# Patient Record
Sex: MALE | Race: WHITE | NOT HISPANIC OR LATINO | URBAN - METROPOLITAN AREA
[De-identification: names, ages, dates, MRNs, and addresses within clinical notes are randomized per-mention and may not be internally consistent; named-entity substitution may affect disease eponyms.]

---

## 2024-01-23 ENCOUNTER — OFFICE VISIT (OUTPATIENT)
Dept: URGENT CARE | Facility: CLINIC | Age: 54
End: 2024-01-23
Payer: COMMERCIAL

## 2024-01-23 VITALS
RESPIRATION RATE: 18 BRPM | HEART RATE: 98 BPM | SYSTOLIC BLOOD PRESSURE: 126 MMHG | WEIGHT: 214 LBS | OXYGEN SATURATION: 97 % | BODY MASS INDEX: 29.96 KG/M2 | DIASTOLIC BLOOD PRESSURE: 84 MMHG | HEIGHT: 71 IN | TEMPERATURE: 98.5 F

## 2024-01-23 DIAGNOSIS — B34.9 ACUTE VIRAL SYNDROME: Primary | ICD-10-CM

## 2024-01-23 PROCEDURE — 99213 OFFICE O/P EST LOW 20 MIN: CPT

## 2024-01-23 NOTE — LETTER
January 23, 2024     Patient: Herbert Xavier   YOB: 1970   Date of Visit: 1/23/2024       To Whom it May Concern:    Herbert Xavier was seen in my clinic on 1/23/2024. He may return to work on 1/25/2024 . Please excuse missed days.     If you have any questions or concerns, please don't hesitate to call.         Sincerely,          Edna Posadas PA-C        CC: No Recipients

## 2024-01-23 NOTE — PATIENT INSTRUCTIONS
Continue with supportive care at home.   Sudafed, ashley pot, ibuprofen over the counter.   No signs of bacterial infection today.   Follow up with PCP in 3-5 days if no improvement.   Proceed to ER if symptoms worsen.  Viral Syndrome   AMBULATORY CARE:   Viral syndrome  is a term used for symptoms of an infection caused by a virus. Viruses are spread easily from person to person on shared items.  Signs and symptoms  may start slowly or suddenly and last hours to days. They can be mild to severe and can change over days or hours. You may have any of the following:  Fever and chills    A runny or stuffy nose    Cough, sore throat, or hoarseness    Headache, or pain and pressure around your eyes    Muscle aches and joint pain    Shortness of breath or wheezing    Abdominal pain, cramps, and diarrhea    Nausea, vomiting, or loss of appetite    Call your local emergency number (911 in the [place]), or have someone call if:   You have a seizure.    You cannot be woken.    You have chest pain or trouble breathing.    Seek care immediately if:   You have a stiff neck, a bad headache, and sensitivity to light.    You feel weak, dizzy, or confused.    You stop urinating or urinate a lot less than usual.    You cough up blood or thick yellow or green mucus.    You have severe abdominal pain or your abdomen is larger than usual.    Call your doctor if:   Your symptoms do not get better with treatment or get worse after 3 days.    You have a rash or ear pain.    You have burning when you urinate.    You have questions or concerns about your condition or care.    Treatment for viral syndrome  may include medicines to manage your symptoms. Antibiotics are not given for a viral infection. You may  need any of the following:  Acetaminophen  decreases pain and fever. It is available without a doctor's order. Ask how much to take and how often to take it. Follow directions. Read the labels of all other medicines you are using to see if  they also contain acetaminophen, or ask your doctor or pharmacist. Acetaminophen can cause liver damage if not taken correctly.    NSAIDs , such as ibuprofen, help decrease swelling, pain, and fever. NSAIDs can cause stomach bleeding or kidney problems in certain people. If you take blood thinner medicine, always ask your healthcare provider if NSAIDs are safe for you. Always read the medicine label and follow directions.    Cold medicine  helps decrease swelling, control a cough, and relieve chest or nasal congestion.    Saline nasal spray  helps decrease nasal congestion.    Manage your symptoms:   Drink liquids as directed to prevent dehydration.  Ask how much liquid to drink each day and which liquids are best for you. Do not drink liquids with caffeine. Caffeine can make dehydration worse.     Get plenty of rest to help your body heal.  Take naps throughout the day. Ask your healthcare provider when you can return to work and your normal activities.    Use a cool mist humidifier  to increase air moisture in your home. This may make it easier for you to breathe and help decrease your cough.     Drink tea with honey or use cough drops for a sore throat.  Cough drops are available without a doctor's order. Follow directions for taking cough drops.    Do not smoke or be close to anyone who is smoking.  Nicotine and other chemicals in cigarettes and cigars can cause lung damage. Smoking can also delay healing. Ask your healthcare provider for information if you currently smoke and need help to quit. E-cigarettes or smokeless tobacco still contain nicotine. Talk to your healthcare provider before you use these products.    Prevent the spread of germs:   Wash your hands often throughout the day.  Use soap and water. Rub your soapy hands together, lacing your fingers, for at least 20 seconds. Rinse with warm, running water. Dry your hands with a clean towel or paper towel. Use hand  that contains alcohol if  soap and water are not available. Teach children how to wash their hands and use hand .         Cover sneezes and coughs.  Turn your face away and cover your mouth and nose with a tissue. Throw the tissue away. Use the bend of your arm if a tissue is not available. Then wash your hands well with soap and water or use hand . Teach children how to cover a cough or sneeze.    Stay home while you are sick.  Avoid crowds as much as possible.    Get the influenza (flu) vaccine as soon as recommended each year.  The flu vaccine is available starting in September or October. Ask your healthcare provider about the pneumonia vaccine. This vaccine is usually recommended every 5 years in older adults.       Follow up with your doctor as directed:  Write down your questions so you remember to ask them during your visits.  © Copyright [employer] 2023 Information is for End User's use only and may not be sold, redistributed or otherwise used for commercial purposes.  The above information is an  only. It is not intended as medical advice for individual conditions or treatments. Talk to your doctor, nurse or pharmacist before following any medical regimen to see if it is safe and effective for you.

## 2024-01-23 NOTE — PROGRESS NOTES
[facility]        NAME: Herbert Xavier is a 53 y.o. male  : 1970    MRN: [id number]  DATE: 2024  TIME: 12:50 PM    Assessment and Plan   Acute viral syndrome [B34.9]  1. Acute viral syndrome        Supportive care recommended.  Continue with Sudafed, ibuprofen, and Madeleine-pot as needed.  Patient Instructions   Continue with supportive care at home.   Sudafed, madeleine pot, ibuprofen over the counter.   No signs of bacterial infection today.   Follow up with PCP in 3-5 days if no improvement.   Proceed to ER if symptoms worsen.  Chief Complaint     Chief Complaint   Patient presents with    Cold Like Symptoms     Patient needs doctors note. He was having flu like sxs on Sat that have resolved except a cough. He was clammy but unsure of he had a fever. COVID was negative     History of Present Illness     Herbert Xavier is a 53 y.o. male presenting to the office today for upper respiratory complaints.   Symptoms have been present for 4 days, and include congestion, fatigue, diarrhea. Tested negative for COVID at home. Feels he is improving.    He has tried Sudafed, Hutson Cold and Flu, Madeleine-pot, ibuprofen for his symptoms, some relief.  Sick contacts include:   Review of Systems     Review of Systems   Constitutional:  Positive for appetite change and chills. Negative for fatigue and fever.   HENT:  Positive for congestion and sinus pressure. Negative for ear pain, sinus pain, sore throat and trouble swallowing.    Respiratory:  Positive for cough. Negative for shortness of breath, wheezing and stridor.    Cardiovascular:  Negative for chest pain and palpitations.   Gastrointestinal:  Positive for diarrhea. Negative for abdominal pain, nausea and vomiting.   Genitourinary: Negative.  Negative for difficulty urinating.   Musculoskeletal:  Negative for myalgias.   Skin:  Negative for color change and rash.   Neurological:  Negative for seizures and syncope.     Current  "Medications     No current outpatient medications on file.    Current Allergies     Allergies as of 01/23/2024    (No Known Allergies)            The following portions of the patient's history were reviewed and updated as appropriate: allergies, current medications, past family history, past medical history, past social history, past surgical history and problem list.     History reviewed. No pertinent past medical history.    History reviewed. No pertinent surgical history.    No family history on file.    Medications have been verified.    Objective     /84   Pulse 98   Temp 98.5 °F (36.9 °C) (Tympanic)   Resp 18   Ht 5' 11\" (1.803 m)   Wt 97.1 kg (214 lb)   SpO2 97%   BMI 29.85 kg/m²   No LMP for male patient.     Physical Exam     Physical Exam  Vitals and nursing note reviewed.   Constitutional:       General: He is not in acute distress.     Appearance: Normal appearance. He is well-developed and normal weight. He is not ill-appearing, toxic-appearing or diaphoretic.   HENT:      Head: Normocephalic and atraumatic.      Right Ear: Tympanic membrane, ear canal and external ear normal. No drainage, swelling or tenderness. No middle ear effusion. There is no impacted cerumen. Tympanic membrane is not erythematous.      Left Ear: Tympanic membrane, ear canal and external ear normal. No drainage, swelling or tenderness.  No middle ear effusion. There is no impacted cerumen. Tympanic membrane is not erythematous.      Nose: Congestion and rhinorrhea present.      Mouth/Throat:      Mouth: Mucous membranes are moist. No oral lesions.      Pharynx: Uvula midline. Posterior oropharyngeal erythema present. No pharyngeal swelling, oropharyngeal exudate or uvula swelling.      Tonsils: No tonsillar exudate or tonsillar abscesses.   Eyes:      General: No scleral icterus.        Right eye: No discharge.         Left eye: No discharge.      Conjunctiva/sclera: Conjunctivae normal.   Neck:      Thyroid: No " thyromegaly.   Cardiovascular:      Rate and Rhythm: Normal rate and regular rhythm.      Pulses: Normal pulses.      Heart sounds: Normal heart sounds. No murmur heard.     No friction rub. No gallop.   Pulmonary:      Effort: Pulmonary effort is normal. No respiratory distress.      Breath sounds: Normal breath sounds. No stridor. No wheezing, rhonchi or rales.   Chest:      Chest wall: No tenderness.   Musculoskeletal:         General: Normal range of motion.      Cervical back: Normal range of motion and neck supple.   Lymphadenopathy:      Cervical: No cervical adenopathy.   Skin:     General: Skin is warm and dry.      Capillary Refill: Capillary refill takes less than 2 seconds.   Neurological:      General: No focal deficit present.      Mental Status: He is alert and oriented to person, place, and time.   Psychiatric:         Mood and Affect: Mood normal.         Behavior: Behavior normal.

## 2024-07-01 ENCOUNTER — OFFICE VISIT (OUTPATIENT)
Dept: FAMILY MEDICINE CLINIC | Facility: CLINIC | Age: 54
End: 2024-07-01
Payer: COMMERCIAL

## 2024-07-01 VITALS
DIASTOLIC BLOOD PRESSURE: 74 MMHG | WEIGHT: 220 LBS | SYSTOLIC BLOOD PRESSURE: 130 MMHG | BODY MASS INDEX: 29.8 KG/M2 | RESPIRATION RATE: 20 BRPM | HEIGHT: 72 IN | HEART RATE: 69 BPM | OXYGEN SATURATION: 96 %

## 2024-07-01 DIAGNOSIS — Z11.4 SCREENING FOR HIV (HUMAN IMMUNODEFICIENCY VIRUS): ICD-10-CM

## 2024-07-01 DIAGNOSIS — Z80.6 FAMILY HISTORY OF LEUKEMIA: ICD-10-CM

## 2024-07-01 DIAGNOSIS — Z13.220 SCREENING FOR LIPID DISORDERS: ICD-10-CM

## 2024-07-01 DIAGNOSIS — R53.83 OTHER FATIGUE: ICD-10-CM

## 2024-07-01 DIAGNOSIS — E66.3 OVERWEIGHT (BMI 25.0-29.9): ICD-10-CM

## 2024-07-01 DIAGNOSIS — Z12.11 SCREENING FOR COLON CANCER: ICD-10-CM

## 2024-07-01 DIAGNOSIS — K42.9 UMBILICAL HERNIA WITHOUT OBSTRUCTION AND WITHOUT GANGRENE: ICD-10-CM

## 2024-07-01 DIAGNOSIS — Z12.5 SCREENING FOR PROSTATE CANCER: ICD-10-CM

## 2024-07-01 DIAGNOSIS — Z11.59 NEED FOR HEPATITIS C SCREENING TEST: ICD-10-CM

## 2024-07-01 DIAGNOSIS — Z00.00 HEALTHCARE MAINTENANCE: ICD-10-CM

## 2024-07-01 DIAGNOSIS — R03.0 WHITE COAT SYNDROME WITHOUT DIAGNOSIS OF HYPERTENSION: Primary | ICD-10-CM

## 2024-07-01 PROCEDURE — 99204 OFFICE O/P NEW MOD 45 MIN: CPT | Performed by: FAMILY MEDICINE

## 2024-07-01 NOTE — PROGRESS NOTES
Ambulatory Visit  Name: Herbert Xavier      : 1970      MRN: [id number]  Encounter Provider: Nitesh Holden DO  Encounter Date: 2024   Encounter department: [facility]    1.  Whitecoat syndrome without hypertension.  Patient states he is always had this.  Blood pressure normalizes by end of office visit we will continue to monitor off medication  2.  BMI 29.84 patient with mild muscular body habitus unsure if this is accurate  3.  Healthcare maintenance discussed screening for hepatitis C, HIV and lipid, orders placed  4.  Screening colon cancer Cologuard ordered  5.  Screening prostate cancer PSA ordered  6.  Pain umbilical hernia, present times years symptom-free patient declines surgical intervention at this time if becomes symptomatic he will make office visit  7.  Mild fatigue will check TSH and testosterone  8.  Family history of leukemia, CBC with differential ordered  9.  Return in 1 year sooner if needed      Assessment & Plan   1. White coat syndrome without diagnosis of hypertension  Assessment & Plan:  Blood pressure normalizes by end of office visit will monitor without medication at present  Orders:  -     CBC and differential; Future; Expected date: 2024  -     Comprehensive metabolic panel; Future; Expected date: 2024  -     Lipid Panel with Direct LDL reflex; Future; Expected date: 2024  -     TSH, 3rd generation with Free T4 reflex; Future; Expected date: 2024  -     UA (URINE) with reflex to Scope; Future; Expected date: 2024  -     PSA, Total Screen; Future; Expected date: 2024  -     Testosterone; Future; Expected date: 2024  2. Overweight (BMI 25.0-29.9)  Assessment & Plan:  BMI 29.84 patient does have muscular body habitus unsure if this is accurate  Orders:  -     CBC and differential; Future; Expected date: 2024  -     Comprehensive metabolic panel; Future; Expected date: 2024  -     Lipid Panel  with Direct LDL reflex; Future; Expected date: 07/02/2024  -     TSH, 3rd generation with Free T4 reflex; Future; Expected date: 07/02/2024  -     UA (URINE) with reflex to Scope; Future; Expected date: 07/02/2024  -     PSA, Total Screen; Future; Expected date: 07/02/2024  -     Testosterone; Future; Expected date: 07/02/2024  3. Healthcare maintenance  Assessment & Plan:  Discussed screening for hepatitis C, HIV, and lipids, orders placed  Orders:  -     CBC and differential; Future; Expected date: 07/02/2024  -     Comprehensive metabolic panel; Future; Expected date: 07/02/2024  -     Lipid Panel with Direct LDL reflex; Future; Expected date: 07/02/2024  -     TSH, 3rd generation with Free T4 reflex; Future; Expected date: 07/02/2024  -     UA (URINE) with reflex to Scope; Future; Expected date: 07/02/2024  -     PSA, Total Screen; Future; Expected date: 07/02/2024  -     Testosterone; Future; Expected date: 07/02/2024  4. Screening for colon cancer  Assessment & Plan:  Discussed colonoscopy versus Cologuard.  Average risk.  Patient chose Cologuard, ordered  Orders:  -     Cologuard  -     CBC and differential; Future; Expected date: 07/02/2024  -     Comprehensive metabolic panel; Future; Expected date: 07/02/2024  -     Lipid Panel with Direct LDL reflex; Future; Expected date: 07/02/2024  -     TSH, 3rd generation with Free T4 reflex; Future; Expected date: 07/02/2024  -     UA (URINE) with reflex to Scope; Future; Expected date: 07/02/2024  -     PSA, Total Screen; Future; Expected date: 07/02/2024  -     Testosterone; Future; Expected date: 07/02/2024  5. Screening for prostate cancer  Assessment & Plan:  PSA ordered  Orders:  -     CBC and differential; Future; Expected date: 07/02/2024  -     Comprehensive metabolic panel; Future; Expected date: 07/02/2024  -     Lipid Panel with Direct LDL reflex; Future; Expected date: 07/02/2024  -     TSH, 3rd generation with Free T4 reflex; Future; Expected date:  07/02/2024  -     UA (URINE) with reflex to Scope; Future; Expected date: 07/02/2024  -     PSA, Total Screen; Future; Expected date: 07/02/2024  -     Testosterone; Future; Expected date: 07/02/2024  6. Umbilical hernia without obstruction and without gangrene  Assessment & Plan:  This has been present for years and is symptom-free patient declined surgical intervention at this time and chooses to observe  Orders:  -     CBC and differential; Future; Expected date: 07/02/2024  -     Comprehensive metabolic panel; Future; Expected date: 07/02/2024  -     Lipid Panel with Direct LDL reflex; Future; Expected date: 07/02/2024  -     TSH, 3rd generation with Free T4 reflex; Future; Expected date: 07/02/2024  -     UA (URINE) with reflex to Scope; Future; Expected date: 07/02/2024  -     PSA, Total Screen; Future; Expected date: 07/02/2024  -     Testosterone; Future; Expected date: 07/02/2024  7. Other fatigue  -     CBC and differential; Future; Expected date: 07/02/2024  -     Comprehensive metabolic panel; Future; Expected date: 07/02/2024  -     Lipid Panel with Direct LDL reflex; Future; Expected date: 07/02/2024  -     TSH, 3rd generation with Free T4 reflex; Future; Expected date: 07/02/2024  -     UA (URINE) with reflex to Scope; Future; Expected date: 07/02/2024  -     PSA, Total Screen; Future; Expected date: 07/02/2024  -     Testosterone; Future; Expected date: 07/02/2024  8. Family history of leukemia  -     CBC and differential; Future; Expected date: 07/02/2024  -     Comprehensive metabolic panel; Future; Expected date: 07/02/2024  -     Lipid Panel with Direct LDL reflex; Future; Expected date: 07/02/2024  -     TSH, 3rd generation with Free T4 reflex; Future; Expected date: 07/02/2024  -     UA (URINE) with reflex to Scope; Future; Expected date: 07/02/2024  -     PSA, Total Screen; Future; Expected date: 07/02/2024  -     Testosterone; Future; Expected date: 07/02/2024  9. Need for hepatitis C  screening test  -     Hepatitis C Antibody; Future; Expected date: 07/02/2024  -     CBC and differential; Future; Expected date: 07/02/2024  -     Comprehensive metabolic panel; Future; Expected date: 07/02/2024  -     Lipid Panel with Direct LDL reflex; Future; Expected date: 07/02/2024  -     TSH, 3rd generation with Free T4 reflex; Future; Expected date: 07/02/2024  -     UA (URINE) with reflex to Scope; Future; Expected date: 07/02/2024  -     PSA, Total Screen; Future; Expected date: 07/02/2024  -     Testosterone; Future; Expected date: 07/02/2024  10. Screening for HIV (human immunodeficiency virus)  -     HIV 1/2 AG/AB w Reflex [facility] for 2 yr old and above; Future; Expected date: 07/02/2024  -     CBC and differential; Future; Expected date: 07/02/2024  -     Comprehensive metabolic panel; Future; Expected date: 07/02/2024  -     Lipid Panel with Direct LDL reflex; Future; Expected date: 07/02/2024  -     TSH, 3rd generation with Free T4 reflex; Future; Expected date: 07/02/2024  -     UA (URINE) with reflex to Scope; Future; Expected date: 07/02/2024  -     PSA, Total Screen; Future; Expected date: 07/02/2024  -     Testosterone; Future; Expected date: 07/02/2024  11. Screening for lipid disorders  -     CBC and differential; Future; Expected date: 07/02/2024  -     Comprehensive metabolic panel; Future; Expected date: 07/02/2024  -     Lipid Panel with Direct LDL reflex; Future; Expected date: 07/02/2024  -     TSH, 3rd generation with Free T4 reflex; Future; Expected date: 07/02/2024  -     UA (URINE) with reflex to Scope; Future; Expected date: 07/02/2024  -     PSA, Total Screen; Future; Expected date: 07/02/2024  -     Testosterone; Future; Expected date: 07/02/2024      Depression Screening and Follow-up Plan: Patient was screened for depression during today's encounter. They screened negative with a PHQ-2 score of 0.      History of Present Illness     Patient has not seen a family doctor in years.   Patient's only complaint he gets more tired than he did in his 30s.  Otherwise patient doing well.  History was discussed with the patient        Review of Systems   Constitutional:         HPI   HENT: Negative.     Eyes: Negative.    Respiratory: Negative.     Cardiovascular: Negative.    Gastrointestinal: Negative.    Endocrine: Negative.    Genitourinary: Negative.    Musculoskeletal: Negative.    Skin: Negative.    Allergic/Immunologic: Negative.    Neurological: Negative.    Hematological: Negative.    Psychiatric/Behavioral: Negative.         Objective     /74   Pulse 69   Resp 20   Ht 6' (1.829 m)   Wt 99.8 kg (220 lb)   SpO2 96%   BMI 29.84 kg/m²     Physical Exam  Vitals and nursing note reviewed.   Constitutional:       General: He is not in acute distress.     Appearance: Normal appearance. He is not ill-appearing, toxic-appearing or diaphoretic.      Comments: Mildly muscular body habitus   HENT:      Head: Normocephalic and atraumatic.      Right Ear: Tympanic membrane normal.      Left Ear: Tympanic membrane normal.      Nose: Nose normal.      Mouth/Throat:      Mouth: Mucous membranes are moist.      Pharynx: Oropharynx is clear. No oropharyngeal exudate or posterior oropharyngeal erythema.   Eyes:      General: No scleral icterus.        Right eye: No discharge.         Left eye: No discharge.      Extraocular Movements: Extraocular movements intact.      Conjunctiva/sclera: Conjunctivae normal.      Pupils: Pupils are equal, round, and reactive to light.   Neck:      Vascular: No carotid bruit.   Cardiovascular:      Rate and Rhythm: Normal rate and regular rhythm.      Heart sounds: Normal heart sounds.   Pulmonary:      Effort: Pulmonary effort is normal.      Breath sounds: Normal breath sounds.   Abdominal:      General: Bowel sounds are normal. There is no distension.      Palpations: Abdomen is soft. There is no mass.      Tenderness: There is no abdominal tenderness. There is no  right CVA tenderness, left CVA tenderness, guarding or rebound.      Hernia: A hernia is present.      Comments: Small easily reducible 1 x 1 cm umbilical hernia, nontender nonpulsatile   Genitourinary:     Penis: Normal.       Testes: Normal.   Musculoskeletal:         General: No swelling, tenderness, deformity or signs of injury.      Cervical back: Neck supple.      Right lower leg: No edema.      Left lower leg: No edema.   Lymphadenopathy:      Cervical: No cervical adenopathy.   Skin:     General: Skin is warm and dry.   Neurological:      General: No focal deficit present.      Mental Status: He is alert and oriented to person, place, and time.      Cranial Nerves: No cranial nerve deficit.      Sensory: No sensory deficit.      Motor: No weakness.      Coordination: Coordination normal.      Gait: Gait normal.      Deep Tendon Reflexes: Reflexes normal.   Psychiatric:         Mood and Affect: Mood normal.         Behavior: Behavior normal.         Thought Content: Thought content normal.         Judgment: Judgment normal.       Administrative Statements

## 2024-07-01 NOTE — ASSESSMENT & PLAN NOTE
This has been present for years and is symptom-free patient declined surgical intervention at this time and chooses to observe

## 2024-07-02 ENCOUNTER — APPOINTMENT (OUTPATIENT)
Dept: LAB | Facility: CLINIC | Age: 54
End: 2024-07-02
Payer: COMMERCIAL

## 2024-07-02 DIAGNOSIS — Z00.00 HEALTHCARE MAINTENANCE: ICD-10-CM

## 2024-07-02 DIAGNOSIS — Z11.4 SCREENING FOR HIV (HUMAN IMMUNODEFICIENCY VIRUS): ICD-10-CM

## 2024-07-02 DIAGNOSIS — R53.83 OTHER FATIGUE: ICD-10-CM

## 2024-07-02 DIAGNOSIS — Z80.6 FAMILY HISTORY OF LEUKEMIA: ICD-10-CM

## 2024-07-02 DIAGNOSIS — R82.90 ABNORMAL FINDING ON URINALYSIS: Primary | ICD-10-CM

## 2024-07-02 DIAGNOSIS — E66.3 OVERWEIGHT (BMI 25.0-29.9): ICD-10-CM

## 2024-07-02 DIAGNOSIS — K42.9 UMBILICAL HERNIA WITHOUT OBSTRUCTION AND WITHOUT GANGRENE: ICD-10-CM

## 2024-07-02 DIAGNOSIS — Z12.11 SCREENING FOR COLON CANCER: ICD-10-CM

## 2024-07-02 DIAGNOSIS — Z12.5 SCREENING FOR PROSTATE CANCER: ICD-10-CM

## 2024-07-02 DIAGNOSIS — Z11.59 NEED FOR HEPATITIS C SCREENING TEST: ICD-10-CM

## 2024-07-02 DIAGNOSIS — R03.0 WHITE COAT SYNDROME WITHOUT DIAGNOSIS OF HYPERTENSION: ICD-10-CM

## 2024-07-02 DIAGNOSIS — Z13.220 SCREENING FOR LIPID DISORDERS: ICD-10-CM

## 2024-07-02 LAB
BACTERIA UR QL AUTO: ABNORMAL /HPF
BASOPHILS # BLD AUTO: 0.03 THOUSANDS/ÂΜL (ref 0–0.1)
BASOPHILS NFR BLD AUTO: 0 % (ref 0–1)
BILIRUB UR QL STRIP: NEGATIVE
CLARITY UR: CLEAR
COLOR UR: YELLOW
EOSINOPHIL # BLD AUTO: 0.11 THOUSAND/ÂΜL (ref 0–0.61)
EOSINOPHIL NFR BLD AUTO: 2 % (ref 0–6)
ERYTHROCYTE [DISTWIDTH] IN BLOOD BY AUTOMATED COUNT: 13.4 % (ref 11.6–15.1)
GLUCOSE UR STRIP-MCNC: NEGATIVE MG/DL
HCT VFR BLD AUTO: 44.9 % (ref 36.5–49.3)
HCV AB SER QL: NORMAL
HGB BLD-MCNC: 15.7 G/DL (ref 12–17)
HGB UR QL STRIP.AUTO: ABNORMAL
HIV 1+2 AB+HIV1 P24 AG SERPL QL IA: NORMAL
HIV 2 AB SERPL QL IA: NORMAL
HIV1 AB SERPL QL IA: NORMAL
HIV1 P24 AG SERPL QL IA: NORMAL
IMM GRANULOCYTES # BLD AUTO: 0.02 THOUSAND/UL (ref 0–0.2)
IMM GRANULOCYTES NFR BLD AUTO: 0 % (ref 0–2)
KETONES UR STRIP-MCNC: NEGATIVE MG/DL
LEUKOCYTE ESTERASE UR QL STRIP: NEGATIVE
LYMPHOCYTES # BLD AUTO: 2.38 THOUSANDS/ÂΜL (ref 0.6–4.47)
LYMPHOCYTES NFR BLD AUTO: 33 % (ref 14–44)
MCH RBC QN AUTO: 30.2 PG (ref 26.8–34.3)
MCHC RBC AUTO-ENTMCNC: 35 G/DL (ref 31.4–37.4)
MCV RBC AUTO: 86 FL (ref 82–98)
MONOCYTES # BLD AUTO: 0.68 THOUSAND/ÂΜL (ref 0.17–1.22)
MONOCYTES NFR BLD AUTO: 9 % (ref 4–12)
MUCOUS THREADS UR QL AUTO: ABNORMAL
NEUTROPHILS # BLD AUTO: 4 THOUSANDS/ÂΜL (ref 1.85–7.62)
NEUTS SEG NFR BLD AUTO: 56 % (ref 43–75)
NITRITE UR QL STRIP: NEGATIVE
NON-SQ EPI CELLS URNS QL MICRO: ABNORMAL /HPF
NRBC BLD AUTO-RTO: 0 /100 WBCS
PH UR STRIP.AUTO: 7 [PH]
PLATELET # BLD AUTO: 288 THOUSANDS/UL (ref 149–390)
PMV BLD AUTO: 9.6 FL (ref 8.9–12.7)
PROT UR STRIP-MCNC: ABNORMAL MG/DL
PSA SERPL-MCNC: 0.84 NG/ML (ref 0–4)
RBC # BLD AUTO: 5.2 MILLION/UL (ref 3.88–5.62)
RBC #/AREA URNS AUTO: ABNORMAL /HPF
SP GR UR STRIP.AUTO: 1.02 (ref 1–1.03)
TESTOST SERPL-MSCNC: 431 NG/DL
TSH SERPL DL<=0.05 MIU/L-ACNC: 3.39 UIU/ML (ref 0.45–4.5)
UROBILINOGEN UR STRIP-ACNC: <2 MG/DL
WBC # BLD AUTO: 7.22 THOUSAND/UL (ref 4.31–10.16)
WBC #/AREA URNS AUTO: ABNORMAL /HPF

## 2024-07-02 PROCEDURE — G0103 PSA SCREENING: HCPCS

## 2024-07-02 PROCEDURE — 80061 LIPID PANEL: CPT

## 2024-07-02 PROCEDURE — 81001 URINALYSIS AUTO W/SCOPE: CPT

## 2024-07-02 PROCEDURE — 87389 HIV-1 AG W/HIV-1&-2 AB AG IA: CPT

## 2024-07-02 PROCEDURE — 80053 COMPREHEN METABOLIC PANEL: CPT

## 2024-07-02 PROCEDURE — 84443 ASSAY THYROID STIM HORMONE: CPT

## 2024-07-02 PROCEDURE — 36415 COLL VENOUS BLD VENIPUNCTURE: CPT

## 2024-07-02 PROCEDURE — 85025 COMPLETE CBC W/AUTO DIFF WBC: CPT

## 2024-07-02 PROCEDURE — 84403 ASSAY OF TOTAL TESTOSTERONE: CPT

## 2024-07-02 PROCEDURE — 86803 HEPATITIS C AB TEST: CPT

## 2024-07-03 LAB
ALBUMIN SERPL BCG-MCNC: 4.1 G/DL (ref 3.5–5)
ALP SERPL-CCNC: 67 U/L (ref 34–104)
ALT SERPL W P-5'-P-CCNC: 33 U/L (ref 7–52)
ANION GAP SERPL CALCULATED.3IONS-SCNC: 10 MMOL/L (ref 4–13)
AST SERPL W P-5'-P-CCNC: 23 U/L (ref 13–39)
BILIRUB SERPL-MCNC: 0.58 MG/DL (ref 0.2–1)
BUN SERPL-MCNC: 26 MG/DL (ref 5–25)
CALCIUM SERPL-MCNC: 9.4 MG/DL (ref 8.4–10.2)
CHLORIDE SERPL-SCNC: 103 MMOL/L (ref 96–108)
CHOLEST SERPL-MCNC: 286 MG/DL
CO2 SERPL-SCNC: 25 MMOL/L (ref 21–32)
CREAT SERPL-MCNC: 0.88 MG/DL (ref 0.6–1.3)
GFR SERPL CREATININE-BSD FRML MDRD: 98 ML/MIN/1.73SQ M
GLUCOSE P FAST SERPL-MCNC: 80 MG/DL (ref 65–99)
HDLC SERPL-MCNC: 54 MG/DL
LDLC SERPL CALC-MCNC: 190 MG/DL (ref 0–100)
POTASSIUM SERPL-SCNC: 4.4 MMOL/L (ref 3.5–5.3)
PROT SERPL-MCNC: 6.9 G/DL (ref 6.4–8.4)
SODIUM SERPL-SCNC: 138 MMOL/L (ref 135–147)
TRIGL SERPL-MCNC: 211 MG/DL

## 2024-07-09 ENCOUNTER — OFFICE VISIT (OUTPATIENT)
Dept: FAMILY MEDICINE CLINIC | Facility: CLINIC | Age: 54
End: 2024-07-09
Payer: COMMERCIAL

## 2024-07-09 VITALS
HEART RATE: 87 BPM | SYSTOLIC BLOOD PRESSURE: 136 MMHG | HEIGHT: 71 IN | WEIGHT: 220.6 LBS | OXYGEN SATURATION: 96 % | DIASTOLIC BLOOD PRESSURE: 82 MMHG | BODY MASS INDEX: 30.88 KG/M2

## 2024-07-09 DIAGNOSIS — Z00.00 HEALTHCARE MAINTENANCE: ICD-10-CM

## 2024-07-09 DIAGNOSIS — R80.9 PROTEINURIA, UNSPECIFIED TYPE: ICD-10-CM

## 2024-07-09 DIAGNOSIS — Z12.5 SCREENING FOR PROSTATE CANCER: ICD-10-CM

## 2024-07-09 DIAGNOSIS — R31.21 ASYMPTOMATIC MICROSCOPIC HEMATURIA: ICD-10-CM

## 2024-07-09 DIAGNOSIS — R03.0 WHITE COAT SYNDROME WITHOUT DIAGNOSIS OF HYPERTENSION: ICD-10-CM

## 2024-07-09 DIAGNOSIS — R82.90 ABNORMAL URINE FINDING: ICD-10-CM

## 2024-07-09 DIAGNOSIS — E78.01 FAMILIAL HYPERCHOLESTEROLEMIA: Primary | ICD-10-CM

## 2024-07-09 DIAGNOSIS — Z12.11 SCREENING FOR COLON CANCER: ICD-10-CM

## 2024-07-09 PROCEDURE — 99213 OFFICE O/P EST LOW 20 MIN: CPT | Performed by: FAMILY MEDICINE

## 2024-07-09 RX ORDER — CHLORAL HYDRATE 500 MG
3000 CAPSULE ORAL DAILY
COMMUNITY

## 2024-07-09 NOTE — PROGRESS NOTES
Ambulatory Visit  Name: Herbert Xavier      : 1970      MRN: [id number]  Encounter Provider: Nitesh Holden DO  Encounter Date: 2024   Encounter department: [facility] PRIMARY CARE    1.  Familial hypercholesterolemia  Low-fat diet recommended  Fish oil 3000 mg daily to be used  Patient wishes to try Metamucil  Will repeat in 3 months if not at goal would consider statin therapy  I recommend the whole family get screened for hypercholesterolemia  2.  Abnormal urine finding  3.  Microhematuria  4.  Proteinuria  I favor this to be dehydration  Patient has been increasing fluid intake  Repeat UA in the next week lab  5.  Screening colon cancer, patient did complete Cologuard send in 2024, report pending  7.  Screening prostate cancer, PSA is normal  6.  Healthcare maintenance hepatitis C and HIV screening both normal  8.  Whitecoat syndrome blood pressure normalizes by end of office visit continue to monitor without medication  9.  Return in 3 months for office visit and blood work sooner if needed      Assessment & Plan   1. Familial hypercholesterolemia  Assessment & Plan:  Serena the genetic origins of this.  Patient will try low-fat diet and fish oil check in 3 months.  Whole family should be screened for hypercholesterolemia.  If not at goal would consider statin therapy in 3 months  Orders:  -     Comprehensive metabolic panel; Future; Expected date: 10/09/2024  -     Lipid Panel with Direct LDL reflex; Future; Expected date: 10/09/2024  2. Abnormal urine finding  3. Asymptomatic microscopic hematuria  4. Proteinuria, unspecified type  5. Screening for colon cancer  Assessment & Plan:  Cologuard test pending, send in 2024  6. Screening for prostate cancer  Assessment & Plan:  PSA is normal  7. White coat syndrome without diagnosis of hypertension  Assessment & Plan:  Blood pressure normalizes by end of office visit continue to monitor without medication  8. Healthcare  "maintenance  Assessment & Plan:  HIV and hepatitis C screens negative        Depression Screening and Follow-up Plan: Patient was screened for depression during today's encounter. They screened negative with a PHQ-2 score of 0.      History of Present Illness     Patient is here for blood work follow-up patient's cholesterol is very high.  Patient's family is a cholesterol is very high.  Discussed low-fat diet will try fish oil 3000 mg daily.  Patient will also start Metamucil as recommended by a family friend.  Will recheck in 3 months if still high would consider statin therapy.  Patient's urinalysis and BUN seem to indicate he is mildly dehydrated patient has been increasing his fluids and will repeat urinalysis this week or next week.  Patient did complete Cologuard and send it in 7/5/2024, report is still pending        Review of Systems   Constitutional: Negative.    HENT: Negative.     Eyes: Negative.    Respiratory: Negative.     Cardiovascular: Negative.    Gastrointestinal:         HPI   Endocrine: Negative.    Genitourinary: Negative.    Musculoskeletal: Negative.    Skin: Negative.    Allergic/Immunologic: Negative.    Neurological: Negative.    Hematological: Negative.    Psychiatric/Behavioral: Negative.         Objective     /82   Pulse 87   Ht 5' 11\" (1.803 m)   Wt 100 kg (220 lb 9.6 oz)   SpO2 96%   BMI 30.77 kg/m²     Physical Exam  Vitals and nursing note reviewed.   Constitutional:       Appearance: Normal appearance.   HENT:      Head: Normocephalic and atraumatic.      Mouth/Throat:      Mouth: Mucous membranes are moist.   Eyes:      General: No scleral icterus.  Cardiovascular:      Rate and Rhythm: Normal rate and regular rhythm.      Heart sounds: Normal heart sounds.   Pulmonary:      Effort: Pulmonary effort is normal.      Breath sounds: Normal breath sounds.   Musculoskeletal:      Cervical back: Neck supple. No rigidity.   Skin:     General: Skin is warm and dry. "   Neurological:      General: No focal deficit present.      Mental Status: He is alert.   Psychiatric:         Mood and Affect: Mood normal.       Administrative Statements

## 2024-07-09 NOTE — PATIENT INSTRUCTIONS
I recommend fish oil 3000 mg daily  You may use Metamucil 8 will help lower cholesterol  Low-fat diet recommended  Repeat urinalysis this week or next week remain well-hydrated  Return in 3 months for office visit and blood work sooner if needed    Cholesterol and Your Health   AMBULATORY CARE:   Cholesterol  is a waxy, fat-like substance. Cholesterol is made by your body, but also comes from certain foods you eat. Your body uses cholesterol to make hormones and new cells. Your body also uses cholesterol to protect nerves. Cholesterol comes from foods such as meat and dairy products. Your total cholesterol level is made up by LDL cholesterol, HDL cholesterol, and triglycerides:  LDL cholesterol  is called bad cholesterol  because it forms plaque in your arteries. As plaque builds up, your arteries become narrow, and less blood flows through. When plaque decreases blood flow to your heart, you may have chest pain. If plaque completely blocks an artery that bring blood to your heart, you may have a heart attack. Plaque can break off and form blood clots. Blood clots may block arteries in your brain and cause a stroke.           HDL cholesterol  is called good cholesterol  because it helps remove LDL cholesterol from your arteries. It does this by attaching to LDL cholesterol and carrying it to your liver. Your liver breaks down LDL cholesterol so your body can get rid of it. High levels of HDL cholesterol can help prevent a heart attack and stroke. Low levels of HDL cholesterol can increase your risk for heart disease, heart attack, and stroke.     Triglycerides  are a type of fat that store energy from foods you eat. High levels of triglycerides also cause plaque buildup. This can increase your risk for a heart attack or stroke. If your triglyceride level is high, your LDL cholesterol level may also be high.  How food affects your cholesterol levels:   Unhealthy fats  increase LDL cholesterol and triglyceride levels  in your blood. They are found in foods high in cholesterol, saturated fat, and trans fat:     Cholesterol  is found in eggs, dairy, and meat.     Saturated fat  is found in butter, cheese, ice cream, whole milk, and coconut oil. Saturated fat is also found in meat, such as sausage, hot dogs, and bologna.    Trans fat  is found in liquid oils and is used in fried and baked foods. Foods that contain trans fats include chips, crackers, muffins, sweet rolls, microwave popcorn, and cookies.    Healthy fats,  also called unsaturated fats, help lower LDL cholesterol and triglyceride levels. Healthy fats include the following:     Monounsaturated fats  are found in foods such as olive oil, canola oil, avocado, nuts, and olives.    Polyunsaturated fats,  such as omega 3 fats, are found in fish, such as salmon, trout, and tuna. They can also be found in plant foods such as flaxseed, walnuts, and soybeans.  Other things that affect your cholesterol levels:   Smoking cigarettes    Being overweight or obese     Drinking large amounts of alcohol    Not enough exercise or no exercise    Certain genes passed from your parents to you  What you need to know about having your cholesterol levels checked:  Adults 20 to 45 years of age should have their cholesterol levels checked every 4 to 6 years. Adults 45 years and older should have their cholesterol checked every 1 to 2 years. You may need your cholesterol checked more often, or at a younger age, if you have risk factors for heart disease. You may also need to have your cholesterol checked more often if you have other health conditions, such as diabetes. Blood tests are used to check cholesterol levels. Blood tests measure your levels of triglycerides, LDL cholesterol, and HDL cholesterol.   Cholesterol level goals:  Your cholesterol level goal may depend on your risk for heart disease. It may also depend on your age and other health conditions. Ask your healthcare provider if the  following goals are right for you:  Your total cholesterol level  should be less than 200 mg/dL. This number may also depend on your HDL and LDL cholesterol goals.     Your LDL cholesterol level  should be less than 130 mg/dL.     Your HDL cholesterol level  should be 60 mg/dL or higher.     Your triglyceride level  should be less than 150 mg/dL.  Treatment for high cholesterol:  Treatment for high cholesterol will also decrease your risk of heart disease, heart attack, and stroke. Treatment may include any of the following:  Medicines  may be given to lower your LDL cholesterol, triglyceride levels, or total cholesterol level. You may need medicines to lower your cholesterol if any of the following is true:     You have a history of stroke, TIA, unstable angina, or a heart attack    Your LDL cholesterol level is 190 mg/dL or higher    You are age 40 to 75 years of age, have diabetes, and your LDL cholesterol is 70 mg/dL or higher    You are age 40 to 75 years of age, have risk factors for heart disease, and your LDL cholesterol is 70 mg/dL or higher    Lifestyle changes  include changes to your diet, exercise, weight loss, and quitting smoking. It also includes decreasing the amount of alcohol you drink.     Supplements  include fish oil, red yeast rice, and garlic. Fish oil may help lower your triglyceride and LDL cholesterol levels. It may also increase your HDL cholesterol level. Red yeast rice may help decrease your total cholesterol level and LDL cholesterol level. Garlic may help lower your total cholesterol level. Do not take these supplements without talking to your healthcare provider.   Nutrition to help lower your cholesterol levels:  A registered dietitian can help you create a healthy eating plan. Read food labels and choose foods low in saturated fat, trans fats, and cholesterol.  Decrease the total amount of fat you eat.  Choose lean meats, fat-free or 1% fat milk, and low-fat dairy products, such as  yogurt and cheese. Try to limit or avoid red meats. Limit or do not eat fried foods or baked goods such as cookies.     Replace unhealthy fats with healthy fats.  Cook foods in olive oil or canola oil. Choose soft margarines that are low in saturated fat and trans fat. Seeds, nuts, and avocados are other examples of healthy fats.     Eat foods with omega-3 fats.  Examples include salmon, tuna, mackerel, walnuts, and flaxseed. Eat fish 2 times per week. Children and pregnant women should not eat fish that have high levels of mercury, such as shark, swordfish, and loesya mackerel.    Increase the amount of plant-based foods you eat.  Plant-based foods are low in cholesterol and fat. Eating more of these foods may help lower your cholesterol and help you lose weight. Examples of plant-based foods includes fruits, vegetables, legumes, and whole grains. Replace milk that contains dairy with almond, soy, or coconut milk. Eat beans and foods with soy for protein instead of meat. Ask your healthcare provider or dietitian for more information on plant-based foods.     Increase the amount of fiber you eat.  High-fiber foods can help lower your LDL cholesterol. You should eat between 20 and 30 grams of fiber each day. Eat at least 5 servings of fruits and vegetables each day. Other examples of high-fiber foods include whole-grain or whole-wheat breads, pastas, or cereals, and brown rice. Eat 3 ounces of whole-grain foods each day. Increase fiber slowly. You may have abdominal discomfort, bloating, and gas if you add fiber to your diet too quickly.  Lifestyle changes you can make to help lower your cholesterol levels:   Maintain a healthy weight.  Ask your healthcare provider how much you should weigh. Ask him or her to help you create a weight loss plan if you are overweight. Weight loss can decrease your total cholesterol and triglyceride levels.     Exercise regularly.  Exercise can help lower your total cholesterol level and  maintain a healthy weight. Exercise can also help increase your HDL cholesterol level. Work with your healthcare provider to create an exercise program that is right for you. Get at least 30 minutes of moderate exercise most days of the week. Examples of exercise include brisk walking, swimming, or biking.     Do not smoke.  Nicotine and other chemicals in cigarettes and cigars can damage your lungs, heart, and blood vessels. They can also raise your triglyceride levels. Ask your healthcare provider for information if you currently smoke and need help to quit. E-cigarettes or smokeless tobacco still contain nicotine. Talk to your healthcare provider before you use these products.    Limit or do not drink alcohol.  Alcohol can increase your triglyceride levels. Ask your healthcare provider if it is safe for you to drink alcohol. Also ask how much is safe for you to drink each day.  © 2017 [employer] Information is for End User's use only and may not be sold, redistributed or otherwise used for commercial purposes. All illustrations and images included in CareNotes® are the copyrighted property of [employer] or [employer].  The above information is an  only. It is not intended as medical advice for individual conditions or treatments. Talk to your doctor, nurse or pharmacist before following any medical regimen to see if it is safe and effective for you.

## 2024-07-09 NOTE — ASSESSMENT & PLAN NOTE
Serena the genetic origins of this.  Patient will try low-fat diet and fish oil check in 3 months.  Whole family should be screened for hypercholesterolemia.  If not at goal would consider statin therapy in 3 months

## 2024-07-10 ENCOUNTER — TELEPHONE (OUTPATIENT)
Dept: FAMILY MEDICINE CLINIC | Facility: CLINIC | Age: 54
End: 2024-07-10

## 2024-07-10 LAB — COLOGUARD RESULT REPORTABLE: NEGATIVE

## 2024-07-10 NOTE — TELEPHONE ENCOUNTER
"Pt returning call regarding his Cologuard results. Informed patient per Dr. Holden \" Cologuard is negative repeat 3 years\". Pt had no further questions or concerns.   "

## 2024-07-10 NOTE — TELEPHONE ENCOUNTER
----- Message from Nitesh Holden DO sent at 7/10/2024  2:49 PM EDT -----  Madi is negative repeat 3 years

## 2024-07-31 PROBLEM — Z12.11 SCREENING FOR COLON CANCER: Status: RESOLVED | Noted: 2024-07-01 | Resolved: 2024-07-31

## 2024-07-31 PROBLEM — Z12.5 SCREENING FOR PROSTATE CANCER: Status: RESOLVED | Noted: 2024-07-01 | Resolved: 2024-07-31

## 2024-07-31 PROBLEM — Z00.00 HEALTHCARE MAINTENANCE: Status: RESOLVED | Noted: 2024-07-01 | Resolved: 2024-07-31

## 2024-10-05 ENCOUNTER — APPOINTMENT (OUTPATIENT)
Dept: LAB | Facility: CLINIC | Age: 54
End: 2024-10-05
Payer: COMMERCIAL

## 2024-10-05 DIAGNOSIS — E78.01 FAMILIAL HYPERCHOLESTEROLEMIA: ICD-10-CM

## 2024-10-05 LAB
ALBUMIN SERPL BCG-MCNC: 4.2 G/DL (ref 3.5–5)
ALP SERPL-CCNC: 65 U/L (ref 34–104)
ALT SERPL W P-5'-P-CCNC: 25 U/L (ref 7–52)
ANION GAP SERPL CALCULATED.3IONS-SCNC: 7 MMOL/L (ref 4–13)
AST SERPL W P-5'-P-CCNC: 18 U/L (ref 13–39)
BILIRUB SERPL-MCNC: 0.57 MG/DL (ref 0.2–1)
BUN SERPL-MCNC: 22 MG/DL (ref 5–25)
CALCIUM SERPL-MCNC: 9.3 MG/DL (ref 8.4–10.2)
CHLORIDE SERPL-SCNC: 105 MMOL/L (ref 96–108)
CHOLEST SERPL-MCNC: 210 MG/DL
CO2 SERPL-SCNC: 26 MMOL/L (ref 21–32)
CREAT SERPL-MCNC: 0.84 MG/DL (ref 0.6–1.3)
GFR SERPL CREATININE-BSD FRML MDRD: 100 ML/MIN/1.73SQ M
GLUCOSE P FAST SERPL-MCNC: 93 MG/DL (ref 65–99)
HDLC SERPL-MCNC: 46 MG/DL
LDLC SERPL CALC-MCNC: 138 MG/DL (ref 0–100)
POTASSIUM SERPL-SCNC: 4.1 MMOL/L (ref 3.5–5.3)
PROT SERPL-MCNC: 6.9 G/DL (ref 6.4–8.4)
SODIUM SERPL-SCNC: 138 MMOL/L (ref 135–147)
TRIGL SERPL-MCNC: 129 MG/DL

## 2024-10-05 PROCEDURE — 36415 COLL VENOUS BLD VENIPUNCTURE: CPT

## 2024-10-05 PROCEDURE — 80053 COMPREHEN METABOLIC PANEL: CPT

## 2024-10-05 PROCEDURE — 80061 LIPID PANEL: CPT

## 2024-10-14 ENCOUNTER — OFFICE VISIT (OUTPATIENT)
Dept: FAMILY MEDICINE CLINIC | Facility: CLINIC | Age: 54
End: 2024-10-14
Payer: COMMERCIAL

## 2024-10-14 VITALS
HEART RATE: 67 BPM | OXYGEN SATURATION: 97 % | SYSTOLIC BLOOD PRESSURE: 136 MMHG | HEIGHT: 71 IN | BODY MASS INDEX: 30.24 KG/M2 | WEIGHT: 216 LBS | DIASTOLIC BLOOD PRESSURE: 82 MMHG

## 2024-10-14 DIAGNOSIS — E78.01 FAMILIAL HYPERCHOLESTEROLEMIA: ICD-10-CM

## 2024-10-14 DIAGNOSIS — Z12.5 SCREENING FOR PROSTATE CANCER: ICD-10-CM

## 2024-10-14 DIAGNOSIS — Z23 ENCOUNTER FOR IMMUNIZATION: ICD-10-CM

## 2024-10-14 DIAGNOSIS — G56.02 CARPAL TUNNEL SYNDROME OF LEFT WRIST: ICD-10-CM

## 2024-10-14 DIAGNOSIS — Z00.00 HEALTHCARE MAINTENANCE: ICD-10-CM

## 2024-10-14 DIAGNOSIS — R03.0 WHITE COAT SYNDROME WITHOUT DIAGNOSIS OF HYPERTENSION: ICD-10-CM

## 2024-10-14 DIAGNOSIS — E66.9 OBESITY (BMI 30-39.9): ICD-10-CM

## 2024-10-14 DIAGNOSIS — Z12.11 SCREENING FOR COLON CANCER: ICD-10-CM

## 2024-10-14 DIAGNOSIS — R82.90 ABNORMAL FINDING ON URINALYSIS: Primary | ICD-10-CM

## 2024-10-14 DIAGNOSIS — K42.9 UMBILICAL HERNIA WITHOUT OBSTRUCTION AND WITHOUT GANGRENE: ICD-10-CM

## 2024-10-14 PROBLEM — E66.3 OVERWEIGHT (BMI 25.0-29.9): Status: RESOLVED | Noted: 2024-07-01 | Resolved: 2024-10-14

## 2024-10-14 PROCEDURE — 90471 IMMUNIZATION ADMIN: CPT

## 2024-10-14 PROCEDURE — 90750 HZV VACC RECOMBINANT IM: CPT

## 2024-10-14 PROCEDURE — 90715 TDAP VACCINE 7 YRS/> IM: CPT

## 2024-10-14 PROCEDURE — 90472 IMMUNIZATION ADMIN EACH ADD: CPT

## 2024-10-14 PROCEDURE — 99214 OFFICE O/P EST MOD 30 MIN: CPT | Performed by: FAMILY MEDICINE

## 2024-10-14 RX ORDER — MULTIVITAMIN
1 TABLET ORAL DAILY
COMMUNITY

## 2024-10-14 NOTE — ASSESSMENT & PLAN NOTE
Cologuard is negative repeat in 3 years    Orders:    CBC; Future    Comprehensive metabolic panel; Future    Lipid Panel with Direct LDL reflex; Future    TSH, 3rd generation with Free T4 reflex; Future    UA (URINE) with reflex to Scope; Future    PSA, Total Screen; Future

## 2024-10-14 NOTE — ASSESSMENT & PLAN NOTE
Cock up wrist splint to be worn at bedtime to morning return in few weeks if still with symptoms    Orders:    CBC; Future    Comprehensive metabolic panel; Future    Lipid Panel with Direct LDL reflex; Future    TSH, 3rd generation with Free T4 reflex; Future    UA (URINE) with reflex to Scope; Future    PSA, Total Screen; Future    Cock Up Wrist Splint

## 2024-10-14 NOTE — ASSESSMENT & PLAN NOTE
Stable continue to monitor off medication    Orders:    CBC; Future    Comprehensive metabolic panel; Future    Lipid Panel with Direct LDL reflex; Future    TSH, 3rd generation with Free T4 reflex; Future    UA (URINE) with reflex to Scope; Future    PSA, Total Screen; Future

## 2024-10-14 NOTE — PATIENT INSTRUCTIONS
Wear cock up wrist splint bedtime till morning return in 3 to 4 weeks if still with symptoms  Continue low-fat low-cholesterol diet  Continue fish oil/Krill oil and Metamucil  Return in 1 year for office visit and blood work sooner if needed  Return in 2 months for shingles booster

## 2024-10-14 NOTE — ASSESSMENT & PLAN NOTE
BMI 30.13 patient lost 4 pounds continue diet exercise weight loss    Orders:    CBC; Future    Comprehensive metabolic panel; Future    Lipid Panel with Direct LDL reflex; Future    TSH, 3rd generation with Free T4 reflex; Future    UA (URINE) with reflex to Scope; Future    PSA, Total Screen; Future

## 2024-10-14 NOTE — PROGRESS NOTES
Ambulatory Visit  Name: Herbert Xavier      : 1970      MRN: [id number]  Encounter Provider: Nitesh Holden DO  Encounter Date: 10/14/2024   Encounter department: [facility]    1.  Abnormal urinalysis.  Unsure what happened to lab but they canceled urinalysis ordered patient unable to void in office new order was given he will drop by urinalysis off at the lab tomorrow  2.  Familial hypercholesterolemia, stable continue Metamucil/fish oil diet and exercise  3.  Umbilical hernia asymptomatic at the present time if becomes symptomatic then patient wishes to see general surgery otherwise just observation  4.  White coat syndrome, blood pressure normal continue to monitor  5.  BMI 30.13 diet exercise weight loss recommended patient did lose 4 pounds #6.  Screening colon cancer Cologuard was negative #7.  Left carpal tunnel syndrome cock up wrist splint wearing bedtime to morning return in few weeks if still with symptoms  8.  Screening prostate cancer PSA ordered  9.  Healthcare maintenance Adacel given, Shingrix first dose was given to return with nursing in 2 months for second dose  10.  Return in 1 year for office visit and blood work sooner if needed      Assessment & Plan  Abnormal finding on urinalysis    Orders:    UA (URINE) with reflex to Scope; Future    Urine culture; Future    CBC; Future    Comprehensive metabolic panel; Future    Lipid Panel with Direct LDL reflex; Future    TSH, 3rd generation with Free T4 reflex; Future    UA (URINE) with reflex to Scope; Future    PSA, Total Screen; Future    Familial hypercholesterolemia  LDL has gone from 190 down to 138 continue fish oil/Krill oil and Metamucil    Orders:    CBC; Future    Comprehensive metabolic panel; Future    Lipid Panel with Direct LDL reflex; Future    TSH, 3rd generation with Free T4 reflex; Future    UA (URINE) with reflex to Scope; Future    PSA, Total Screen; Future    Umbilical hernia without  obstruction and without gangrene  Asymptomatic at the present time.  If becomes symptomatic will refer to general surgery    Orders:    CBC; Future    Comprehensive metabolic panel; Future    Lipid Panel with Direct LDL reflex; Future    TSH, 3rd generation with Free T4 reflex; Future    UA (URINE) with reflex to Scope; Future    PSA, Total Screen; Future    White coat syndrome without diagnosis of hypertension  Stable continue to monitor off medication    Orders:    CBC; Future    Comprehensive metabolic panel; Future    Lipid Panel with Direct LDL reflex; Future    TSH, 3rd generation with Free T4 reflex; Future    UA (URINE) with reflex to Scope; Future    PSA, Total Screen; Future    Obesity (BMI 30-39.9)  BMI 30.13 patient lost 4 pounds continue diet exercise weight loss    Orders:    CBC; Future    Comprehensive metabolic panel; Future    Lipid Panel with Direct LDL reflex; Future    TSH, 3rd generation with Free T4 reflex; Future    UA (URINE) with reflex to Scope; Future    PSA, Total Screen; Future    Screening for colon cancer  Cologuard is negative repeat in 3 years    Orders:    CBC; Future    Comprehensive metabolic panel; Future    Lipid Panel with Direct LDL reflex; Future    TSH, 3rd generation with Free T4 reflex; Future    UA (URINE) with reflex to Scope; Future    PSA, Total Screen; Future    Carpal tunnel syndrome of left wrist  Cock up wrist splint to be worn at bedtime to morning return in few weeks if still with symptoms    Orders:    CBC; Future    Comprehensive metabolic panel; Future    Lipid Panel with Direct LDL reflex; Future    TSH, 3rd generation with Free T4 reflex; Future    UA (URINE) with reflex to Scope; Future    PSA, Total Screen; Future    Cock Up Wrist Splint    Screening for prostate cancer    Orders:    CBC; Future    Comprehensive metabolic panel; Future    Lipid Panel with Direct LDL reflex; Future    TSH, 3rd generation with Free T4 reflex; Future    UA (URINE) with  "reflex to Scope; Future    PSA, Total Screen; Future    Healthcare maintenance         Encounter for immunization    Orders:    TDAP VACCINE GREATER THAN OR EQUAL TO 6YO IM    Zoster Vaccine Recombinant IM       History of Present Illness     Patient has lowered his cholesterol quite a bit.  Total went from 286 down to 210.  Triglycerides are from 211 down to 129.  LDL is gone from 190 down to 138.  Patient will continue his fish oil/Krill oil also using Metamucil and diet and exercise.  Patient's only complaint occasionally wakes up with tingling left hand.  Negative weakness          Review of Systems   Constitutional: Negative.    HENT: Negative.     Eyes: Negative.    Respiratory: Negative.     Cardiovascular: Negative.    Gastrointestinal: Negative.    Endocrine: Negative.    Genitourinary: Negative.    Musculoskeletal:         HPI   Skin: Negative.    Allergic/Immunologic: Negative.    Neurological:         HPI   Hematological: Negative.    Psychiatric/Behavioral: Negative.             Objective     /82   Pulse 67   Ht 5' 11\" (1.803 m)   Wt 98 kg (216 lb)   SpO2 97%   BMI 30.13 kg/m²     Physical Exam  Vitals and nursing note reviewed.   Constitutional:       Appearance: Normal appearance.   HENT:      Head: Normocephalic and atraumatic.      Mouth/Throat:      Mouth: Mucous membranes are moist.   Eyes:      General: No scleral icterus.  Neck:      Vascular: No carotid bruit.   Cardiovascular:      Rate and Rhythm: Normal rate and regular rhythm.      Heart sounds: Normal heart sounds.   Pulmonary:      Effort: Pulmonary effort is normal.      Breath sounds: Normal breath sounds.   Abdominal:      General: Bowel sounds are normal.      Palpations: Abdomen is soft.      Tenderness: There is no abdominal tenderness.   Musculoskeletal:         General: No swelling, tenderness or deformity.      Cervical back: Neck supple.      Comments: Of 10 neurovascular intact negative thenar wasting negative " "Tinel's at left wrist positive \"reverse prayer\" left wrist   Skin:     General: Skin is warm and dry.   Neurological:      General: No focal deficit present.      Mental Status: He is alert.      Sensory: No sensory deficit.      Motor: No weakness.   Psychiatric:         Mood and Affect: Mood normal.         "

## 2024-10-14 NOTE — ASSESSMENT & PLAN NOTE
Asymptomatic at the present time.  If becomes symptomatic will refer to general surgery    Orders:    CBC; Future    Comprehensive metabolic panel; Future    Lipid Panel with Direct LDL reflex; Future    TSH, 3rd generation with Free T4 reflex; Future    UA (URINE) with reflex to Scope; Future    PSA, Total Screen; Future

## 2024-10-14 NOTE — ASSESSMENT & PLAN NOTE
LDL has gone from 190 down to 138 continue fish oil/Krill oil and Metamucil    Orders:    CBC; Future    Comprehensive metabolic panel; Future    Lipid Panel with Direct LDL reflex; Future    TSH, 3rd generation with Free T4 reflex; Future    UA (URINE) with reflex to Scope; Future    PSA, Total Screen; Future

## 2024-11-09 ENCOUNTER — APPOINTMENT (OUTPATIENT)
Dept: LAB | Facility: CLINIC | Age: 54
End: 2024-11-09
Payer: COMMERCIAL

## 2024-11-09 DIAGNOSIS — R82.90 ABNORMAL FINDING ON URINALYSIS: ICD-10-CM

## 2024-11-09 LAB
BACTERIA UR QL AUTO: NORMAL /HPF
BILIRUB UR QL STRIP: NEGATIVE
CLARITY UR: CLEAR
COLOR UR: COLORLESS
GLUCOSE UR STRIP-MCNC: NEGATIVE MG/DL
HGB UR QL STRIP.AUTO: ABNORMAL
KETONES UR STRIP-MCNC: NEGATIVE MG/DL
LEUKOCYTE ESTERASE UR QL STRIP: NEGATIVE
MUCOUS THREADS UR QL AUTO: NORMAL
NITRITE UR QL STRIP: NEGATIVE
NON-SQ EPI CELLS URNS QL MICRO: NORMAL /HPF
PH UR STRIP.AUTO: 7 [PH]
PROT UR STRIP-MCNC: NEGATIVE MG/DL
RBC #/AREA URNS AUTO: NORMAL /HPF
SP GR UR STRIP.AUTO: 1.01 (ref 1–1.03)
UROBILINOGEN UR STRIP-ACNC: <2 MG/DL
WBC #/AREA URNS AUTO: NORMAL /HPF

## 2024-11-09 PROCEDURE — 87086 URINE CULTURE/COLONY COUNT: CPT

## 2024-11-09 PROCEDURE — 81001 URINALYSIS AUTO W/SCOPE: CPT

## 2024-11-10 LAB — BACTERIA UR CULT: NORMAL

## 2024-11-11 DIAGNOSIS — R31.21 ASYMPTOMATIC MICROSCOPIC HEMATURIA: Primary | ICD-10-CM

## 2024-11-13 ENCOUNTER — RESULTS FOLLOW-UP (OUTPATIENT)
Dept: FAMILY MEDICINE CLINIC | Facility: CLINIC | Age: 54
End: 2024-11-13

## 2024-11-13 PROBLEM — Z12.11 SCREENING FOR COLON CANCER: Status: RESOLVED | Noted: 2024-07-01 | Resolved: 2024-11-13

## 2024-11-13 NOTE — TELEPHONE ENCOUNTER
Patient called back and message read back to him.  He scheduled the ultra sound and will call urology.

## 2024-11-19 ENCOUNTER — TELEPHONE (OUTPATIENT)
Age: 54
End: 2024-11-19

## 2024-11-19 NOTE — TELEPHONE ENCOUNTER
Patient called today to reschedule appt from 12/13 to 12/23 at 8:20 AM with BREEZY Schneider in [place] due to scheduling conflict.    No further action needed at this time.

## 2024-11-21 ENCOUNTER — HOSPITAL ENCOUNTER (OUTPATIENT)
Dept: ULTRASOUND IMAGING | Facility: CLINIC | Age: 54
Discharge: HOME | End: 2024-11-21
Payer: COMMERCIAL

## 2024-11-21 DIAGNOSIS — R31.21 ASYMPTOMATIC MICROSCOPIC HEMATURIA: ICD-10-CM

## 2024-11-21 PROCEDURE — 76775 US EXAM ABDO BACK WALL LIM: CPT

## 2024-11-27 ENCOUNTER — RESULTS FOLLOW-UP (OUTPATIENT)
Dept: FAMILY MEDICINE CLINIC | Facility: CLINIC | Age: 54
End: 2024-11-27

## 2024-12-17 ENCOUNTER — CLINICAL SUPPORT (OUTPATIENT)
Dept: FAMILY MEDICINE CLINIC | Facility: CLINIC | Age: 54
End: 2024-12-17
Payer: COMMERCIAL

## 2024-12-17 DIAGNOSIS — Z23 ENCOUNTER FOR IMMUNIZATION: Primary | ICD-10-CM

## 2024-12-17 PROCEDURE — 90471 IMMUNIZATION ADMIN: CPT

## 2024-12-17 PROCEDURE — 90750 HZV VACC RECOMBINANT IM: CPT
